# Patient Record
Sex: FEMALE | Race: WHITE | NOT HISPANIC OR LATINO
[De-identification: names, ages, dates, MRNs, and addresses within clinical notes are randomized per-mention and may not be internally consistent; named-entity substitution may affect disease eponyms.]

---

## 2021-11-24 ENCOUNTER — TRANSCRIPTION ENCOUNTER (OUTPATIENT)
Age: 21
End: 2021-11-24

## 2022-10-13 PROBLEM — Z00.00 ENCOUNTER FOR PREVENTIVE HEALTH EXAMINATION: Status: ACTIVE | Noted: 2022-10-13

## 2022-10-14 ENCOUNTER — APPOINTMENT (OUTPATIENT)
Dept: ORTHOPEDIC SURGERY | Facility: CLINIC | Age: 22
End: 2022-10-14

## 2022-10-14 VITALS — HEIGHT: 64 IN | BODY MASS INDEX: 20.49 KG/M2 | WEIGHT: 120 LBS

## 2022-10-14 DIAGNOSIS — Z78.9 OTHER SPECIFIED HEALTH STATUS: ICD-10-CM

## 2022-10-14 DIAGNOSIS — S13.9XXA SPRAIN OF JOINTS AND LIGAMENTS OF UNSPECIFIED PARTS OF NECK, INITIAL ENCOUNTER: ICD-10-CM

## 2022-10-14 PROCEDURE — 72040 X-RAY EXAM NECK SPINE 2-3 VW: CPT

## 2022-10-14 PROCEDURE — 99203 OFFICE O/P NEW LOW 30 MIN: CPT

## 2022-10-14 RX ORDER — NORGESTIMATE AND ETHINYL ESTRADIOL 0.25-0.035
0.25-35 KIT ORAL
Refills: 0 | Status: ACTIVE | COMMUNITY

## 2022-10-14 NOTE — PHYSICAL EXAM
[No bony abnormalities] : No bony abnormalities [Straightening consistent with spasm] : Straightening consistent with spasm [No spinal deformity, fracture, lytic lesion, or marked single level collapse] : No spinal deformity, fracture, lytic lesion, or marked single level collapse [] : no enlarged thyroid [FreeTextEntry9] : *Patient states she had numbness down bilateral arms immediately after the fall.

## 2022-10-14 NOTE — DISCUSSION/SUMMARY
[de-identified] : Options were discussed. She states she experienced numbness/tingling down bilateral arms immediately after the fall. She will avoid any lifting and will try PT. Ultimately, she needs time. \par \par Entered by Sherry Perez acting as scribe.\par Dr. Cano Attestation\par The documentation recorded by the scribe, in my presence, accurately reflects the service I, Dr. Cano, personally performed, and the decisions made by me with my edits as appropriate.\par \par

## 2022-10-14 NOTE — REASON FOR VISIT
[FreeTextEntry2] : neck pain for 4 days after falling backwards into a door and got a concussion in school at [place]

## 2022-10-14 NOTE — HISTORY OF PRESENT ILLNESS
[Neck] : neck [Sudden] : sudden [5] : 5 [7] : 7 [Dull/Aching] : dull/aching [Tightness] : tightness [Constant] : constant [Sleep] : sleep [Nothing helps with pain getting better] : Nothing helps with pain getting better [Student] : Work status: student [de-identified] : Patient presents today with neck pain for 4 days after falling backwards into a door and got a concussion. Went to a medical center up in [place], had a head CT. Went to [facility] yesterday, was given Robaxin while there, and was discharged. Experiencing pain radiating into the right shoulder. Has been having headaches. Denies having difficulty with balance. Denies pain medication. Denies recent imaging.  [] : no [FreeTextEntry7] : into the right shoulder

## 2022-11-21 ENCOUNTER — NON-APPOINTMENT (OUTPATIENT)
Age: 22
End: 2022-11-21

## 2024-02-19 ENCOUNTER — APPOINTMENT (OUTPATIENT)
Dept: ORTHOPEDIC SURGERY | Facility: CLINIC | Age: 24
End: 2024-02-19
Payer: COMMERCIAL

## 2024-02-19 VITALS — BODY MASS INDEX: 20.49 KG/M2 | WEIGHT: 120 LBS | HEIGHT: 64 IN

## 2024-02-19 DIAGNOSIS — Z78.9 OTHER SPECIFIED HEALTH STATUS: ICD-10-CM

## 2024-02-19 DIAGNOSIS — M23.91 UNSPECIFIED INTERNAL DERANGEMENT OF RIGHT KNEE: ICD-10-CM

## 2024-02-19 PROCEDURE — 99213 OFFICE O/P EST LOW 20 MIN: CPT

## 2024-02-19 PROCEDURE — 73562 X-RAY EXAM OF KNEE 3: CPT | Mod: RT

## 2024-02-19 PROCEDURE — 99203 OFFICE O/P NEW LOW 30 MIN: CPT

## 2024-02-19 NOTE — PHYSICAL EXAM
[Normal Mood and Affect] : normal mood and affect [Able to Communicate] : able to communicate [Well Developed] : well developed [Well Nourished] : well nourished [Right] : right knee [NL (140)] : flexion 140 degrees [NL (0)] : extension 0 degrees [5___] : hamstring 5[unfilled]/5 [AP] : anteroposterior [Lateral] : lateral [Salome] : skyline [There are no fractures, subluxations or dislocations. No significant abnormalities are seen] : There are no fractures, subluxations or dislocations. No significant abnormalities are seen [de-identified] : thin [] : negative Lachmann

## 2024-02-19 NOTE — PLAN
[TextEntry] : The patient was advised of the diagnosis. The natural history of the pathology was explained in full to the patient in layman's terms. All questions were answered. The risks and benefits of surgical and non-surgical treatment alternatives were explained in full to the patient.  Patient may weightbear as tolerated.  Will obtain an MRI rt knee

## 2024-02-19 NOTE — HISTORY OF PRESENT ILLNESS
[Sudden] : sudden [5] : 5 [2] : 2 [Dull/Aching] : dull/aching [Sharp] : sharp [Tightness] : tightness [Intermittent] : intermittent [Nothing helps with pain getting better] : Nothing helps with pain getting better [Walking] : walking [Student] : Work status: student [de-identified] : 2/19/24  Initial visit for this 23 year old female who twisted her rt knee arising from a sitting position x 1 week ago. [place]. C/o pain since then.  PMH: NO prior injury [] : no [FreeTextEntry1] : right knee [de-identified] : 2/15/24 [de-identified] :  Eddy  [de-identified] : none

## 2024-02-19 NOTE — ASSESSMENT
----- Message from Denis Babinski, MD sent at 2/2/2023  4:50 PM CST -----  Vitamin D in low 10, can start cholecalcferol 50,000 units 1x/week, repeat vitamin D in 8 weeks. [FreeTextEntry1] : R/O LMT

## 2024-02-29 ENCOUNTER — NON-APPOINTMENT (OUTPATIENT)
Age: 24
End: 2024-02-29

## 2024-08-09 ENCOUNTER — NON-APPOINTMENT (OUTPATIENT)
Age: 24
End: 2024-08-09

## 2025-06-07 ENCOUNTER — NON-APPOINTMENT (OUTPATIENT)
Age: 25
End: 2025-06-07